# Patient Record
Sex: MALE | URBAN - METROPOLITAN AREA
[De-identification: names, ages, dates, MRNs, and addresses within clinical notes are randomized per-mention and may not be internally consistent; named-entity substitution may affect disease eponyms.]

---
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
## 2022-04-27 NOTE — PROCEDURE: TREATMENT REGIMEN
Followed protocol
Detail Level: Zone
Initiate Treatment: Solodyn 80mg daily\\nOnexton in am\\nRetin-A Micro 0.08% at bedtime